# Patient Record
Sex: MALE | Race: WHITE | Employment: FULL TIME | URBAN - METROPOLITAN AREA
[De-identification: names, ages, dates, MRNs, and addresses within clinical notes are randomized per-mention and may not be internally consistent; named-entity substitution may affect disease eponyms.]

---

## 2019-05-13 ENCOUNTER — HOSPITAL ENCOUNTER (EMERGENCY)
Age: 34
Discharge: HOME | End: 2019-05-14
Attending: EMERGENCY MEDICINE
Payer: SELF-PAY

## 2019-05-13 VITALS
HEART RATE: 109 BPM | OXYGEN SATURATION: 98 % | RESPIRATION RATE: 20 BRPM | DIASTOLIC BLOOD PRESSURE: 67 MMHG | SYSTOLIC BLOOD PRESSURE: 147 MMHG | TEMPERATURE: 98.6 F

## 2019-05-13 DIAGNOSIS — T78.40XA ALLERGIC REACTION, INITIAL ENCOUNTER: Primary | ICD-10-CM

## 2019-05-13 PROCEDURE — 99283 EMERGENCY DEPT VISIT LOW MDM: CPT | Performed by: EMERGENCY MEDICINE

## 2019-05-13 PROCEDURE — 96374 THER/PROPH/DIAG INJ IV PUSH: CPT | Performed by: EMERGENCY MEDICINE

## 2019-05-13 PROCEDURE — 96375 TX/PRO/DX INJ NEW DRUG ADDON: CPT | Performed by: EMERGENCY MEDICINE

## 2019-05-13 PROCEDURE — 74011250636 HC RX REV CODE- 250/636: Performed by: EMERGENCY MEDICINE

## 2019-05-13 PROCEDURE — 96372 THER/PROPH/DIAG INJ SC/IM: CPT | Performed by: EMERGENCY MEDICINE

## 2019-05-13 RX ORDER — DIPHENHYDRAMINE HYDROCHLORIDE 50 MG/ML
25 INJECTION, SOLUTION INTRAMUSCULAR; INTRAVENOUS
Status: COMPLETED | OUTPATIENT
Start: 2019-05-13 | End: 2019-05-13

## 2019-05-13 RX ORDER — EPINEPHRINE 1 MG/ML
0.3 INJECTION, SOLUTION, CONCENTRATE INTRAVENOUS ONCE
Status: COMPLETED | OUTPATIENT
Start: 2019-05-13 | End: 2019-05-13

## 2019-05-13 RX ADMIN — EPINEPHRINE 0.3 MG: 1 INJECTION, SOLUTION, CONCENTRATE INTRAVENOUS at 23:23

## 2019-05-13 RX ADMIN — METHYLPREDNISOLONE SODIUM SUCCINATE 125 MG: 125 INJECTION, POWDER, FOR SOLUTION INTRAMUSCULAR; INTRAVENOUS at 23:25

## 2019-05-13 RX ADMIN — SODIUM CHLORIDE 1000 ML: 900 INJECTION, SOLUTION INTRAVENOUS at 23:27

## 2019-05-13 RX ADMIN — DIPHENHYDRAMINE HYDROCHLORIDE 25 MG: 50 INJECTION, SOLUTION INTRAMUSCULAR; INTRAVENOUS at 23:25

## 2019-05-14 RX ORDER — PERMETHRIN 50 MG/G
CREAM TOPICAL
Qty: 60 G | Refills: 0 | Status: SHIPPED | OUTPATIENT
Start: 2019-05-14 | End: 2019-06-13

## 2019-05-14 RX ORDER — NEBIVOLOL 10 MG/1
10 TABLET ORAL DAILY
Qty: 30 TAB | Refills: 0 | Status: SHIPPED | OUTPATIENT
Start: 2019-05-14 | End: 2019-06-13

## 2019-05-14 RX ORDER — EPINEPHRINE 0.3 MG/.3ML
0.3 INJECTION SUBCUTANEOUS
Qty: 2 SYRINGE | Refills: 0 | Status: SHIPPED | OUTPATIENT
Start: 2019-05-14 | End: 2019-05-14

## 2019-05-14 RX ORDER — PREDNISONE 20 MG/1
60 TABLET ORAL DAILY
Qty: 15 TAB | Refills: 0 | Status: SHIPPED | OUTPATIENT
Start: 2019-05-14 | End: 2019-05-19

## 2019-05-14 NOTE — ED TRIAGE NOTES
Pt to er c/o having swellling and sob since around noon today, sts he took 2 benadryl and swelling has continued and sts this sob has gotten worse.

## 2019-05-14 NOTE — DISCHARGE INSTRUCTIONS
Apply the cream as instructed. Take your blood pressure medication. Take steroids for the next 5 days. Use EpiPen as needed for anaphylactic. Follow-up with a local doctor. Return if worsening symptoms.

## 2019-05-14 NOTE — ED PROVIDER NOTES
HPI: 
29-year-old male is here with itchiness and rash to started tonight. Stated he recently moved to the area. Supposed to be on blood pressure medication by systolic tablet daily but has not taken it. He uses testosterone injection in his right buttocks for both low testosterone and to help build muscle. He is here because he started developing rash along the wrist, hand, feet with itchiness. He is currently staying at a hotel room. This started tonight. Denies any fever. No throat swelling nor had some brief episode of shortness of breath. Denies any chest pain. No tongue swelling. No tongue numbness. Denies any IV drug use. No new detergent, body wash, antibiotics, new medication. They'll think that's different is the testosterone that he is currently using he obtained from a friend instead of prescribed by a physician ROS Constitutional: No fever, no chills Skin: + rash Eye: No vision changes ENMT: No sore throat Respiratory: + shortness of breath, no cough Cardiovascular: No chest pain, no palpitations Gastrointestinal: No vomiting, no nausea, no diarrhea, no abdominal pain : No dysuria MSK: No back pain, no muscle pain, no joint pain Neuro: No headache, no change in mental status, no numbness, no tingling, no weakness Psych:  
Endocrine:  
All other review of systems positive per history of present illness and the above otherwise negative or noncontributory. Visit Vitals /67 Pulse (!) 109 Temp 98.6 °F (37 °C) Resp 20 SpO2 98% History reviewed. No pertinent past medical history. History reviewed. No pertinent surgical history. None Adult Exam  
General: alert, no acute distress Head: normocephalic, atraumatic ENT: moist mucous membranes No lingual, sublingual, posterior pharynx swelling or edema noted Neck: supple, non-tender; full range of motion Cardiovascular: regular rate and rhythm, normal peripheral perfusion, no edema Respiratory:  normal respirations; no wheezing, rales or rhonchi Gastrointestinal: soft, non-tender; no rebound or guarding, no peritoneal signs, no distension Back: non-tender, full range of motion Musculoskeletal: normal range of motion, normal strength, no gross deformities Nonspecific erythema without blistering, blanching noted on bilateral wrist, hand, base of the feet, weapon of the feet. Neurological: alert and oriented x 4, no gross focal deficits; normal speech Psychiatric: cooperative; appropriate mood and affect MDM: stated it is getting worse. IM epi given. No shortness of breath at this time. Solu-Medrol given. Benadryl given. IV fluid given. Symptom has not progressed. Concern of possible bug bite, bedbugs since he is currently living at a hotel room. He also has a rash on his right buttocks where he injects the testosterone. chronic, thickened skin, erythematous without purulent discharge, induration. We'll give him a refill for by systolic. We'll give prednisone for the next 5 days. We'll also give a prescription for an EpiPen. Recommend Benadryl 25-50 mg as needed at home. We'll also get permethrin cream over the area of the hand itchiness, wrists and feet itchiness. Dragon voice recognition software was used to create this note. Although the note has been reviewed and corrected where necessary, additional errors may have been overlooked and remain in the text.

## 2019-05-15 ENCOUNTER — HOSPITAL ENCOUNTER (EMERGENCY)
Age: 34
Discharge: HOME | End: 2019-05-15
Attending: EMERGENCY MEDICINE
Payer: SELF-PAY

## 2019-05-15 VITALS
TEMPERATURE: 98.3 F | DIASTOLIC BLOOD PRESSURE: 70 MMHG | OXYGEN SATURATION: 98 % | HEART RATE: 86 BPM | WEIGHT: 255 LBS | RESPIRATION RATE: 14 BRPM | SYSTOLIC BLOOD PRESSURE: 134 MMHG

## 2019-05-15 DIAGNOSIS — L50.9 URTICARIA: Primary | ICD-10-CM

## 2019-05-15 LAB — DEPRECATED S PYO AG THROAT QL EIA: NEGATIVE

## 2019-05-15 PROCEDURE — 87880 STREP A ASSAY W/OPTIC: CPT

## 2019-05-15 PROCEDURE — 74011250636 HC RX REV CODE- 250/636: Performed by: EMERGENCY MEDICINE

## 2019-05-15 PROCEDURE — 99283 EMERGENCY DEPT VISIT LOW MDM: CPT | Performed by: EMERGENCY MEDICINE

## 2019-05-15 PROCEDURE — 96374 THER/PROPH/DIAG INJ IV PUSH: CPT | Performed by: EMERGENCY MEDICINE

## 2019-05-15 PROCEDURE — 96375 TX/PRO/DX INJ NEW DRUG ADDON: CPT | Performed by: EMERGENCY MEDICINE

## 2019-05-15 PROCEDURE — 96361 HYDRATE IV INFUSION ADD-ON: CPT | Performed by: EMERGENCY MEDICINE

## 2019-05-15 PROCEDURE — 87081 CULTURE SCREEN ONLY: CPT

## 2019-05-15 RX ORDER — DIPHENHYDRAMINE HYDROCHLORIDE 50 MG/ML
50 INJECTION, SOLUTION INTRAMUSCULAR; INTRAVENOUS
Status: COMPLETED | OUTPATIENT
Start: 2019-05-15 | End: 2019-05-15

## 2019-05-15 RX ORDER — HYDROXYZINE 50 MG/1
50 TABLET, FILM COATED ORAL
Qty: 20 TAB | Refills: 0 | Status: SHIPPED | OUTPATIENT
Start: 2019-05-15 | End: 2019-05-25

## 2019-05-15 RX ORDER — FAMOTIDINE 10 MG/ML
20 INJECTION INTRAVENOUS
Status: COMPLETED | OUTPATIENT
Start: 2019-05-15 | End: 2019-05-15

## 2019-05-15 RX ADMIN — DIPHENHYDRAMINE HYDROCHLORIDE 50 MG: 50 INJECTION, SOLUTION INTRAMUSCULAR; INTRAVENOUS at 11:31

## 2019-05-15 RX ADMIN — SODIUM CHLORIDE 500 ML: 900 INJECTION, SOLUTION INTRAVENOUS at 11:31

## 2019-05-15 RX ADMIN — METHYLPREDNISOLONE SODIUM SUCCINATE 125 MG: 125 INJECTION, POWDER, FOR SOLUTION INTRAMUSCULAR; INTRAVENOUS at 11:31

## 2019-05-15 RX ADMIN — FAMOTIDINE 20 MG: 10 INJECTION, SOLUTION INTRAVENOUS at 11:31

## 2019-05-15 NOTE — ED PROVIDER NOTES
Olivia Ceron is a 29 y.o. male seen on 5/15/2019 at 5:20 AM in the [address] [place] [address] in room ER04/04. Chief Complaint Patient presents with  Allergic Reaction HPI:  71-year-old male presenting to the emergency department for an itching rash over his body. He states it started this morning getting worse. He reports that he was seen in the emergency department 2 days ago for an allergic reaction for which she received epinephrine in route by EMS. He then received Solu-Medrol and Benadryl here in the emergency department. He states that his symptoms improved. They were unable to identify a definite trigger but he was concerned that it was possibly related to bedbugs are staying in a new hotel that he does not think was very nice. He washed his clothes and then states when he put on the same clothes again he began to have the rash again. He is not having any difficulty breathing, no wheezing. He states his rash is worse around his waistline and also around his watch. He also notes that he's had a little bit of reflux recently. He also takes testosterone injections. He is from [place] and he has been here for 3 weeks and ran out of his medications and he's been buying testosterone off the streets. However he has used it 4-5 times over the last 2 weeks and has not previously had a reaction. He denies any history of tick bites, has not eaten red meat recently. Historian: patient REVIEW OF SYSTEMS Review of Systems Constitutional: Negative for fatigue and fever. HENT: Negative. Eyes: Negative. Respiratory: Negative for cough, chest tightness, shortness of breath and wheezing. Cardiovascular: Negative for chest pain. Gastrointestinal: Negative for abdominal distention, abdominal pain, diarrhea, nausea and vomiting. Genitourinary: Negative for dysuria, flank pain, frequency, genital sores and urgency. Musculoskeletal: Negative. Skin: Positive for rash. Neurological: Negative for dizziness, syncope and headaches. All other systems reviewed and are negative. PAST MEDICAL HISTORY History reviewed. No pertinent past medical history. History reviewed. No pertinent surgical history. Social History Socioeconomic History  Marital status:  Spouse name: Not on file  Number of children: Not on file  Years of education: Not on file  Highest education level: Not on file Tobacco Use  Smoking status: Current Every Day Smoker  Smokeless tobacco: Never Used Substance and Sexual Activity  Alcohol use: Yes  Drug use: Never Prior to Admission Medications Prescriptions Last Dose Informant Patient Reported? Taking? EPINEPHrine (EPIPEN) 0.3 mg/0.3 mL injection   No No  
Si.3 mL by IntraMUSCular route once as needed for Anaphylaxis for up to 1 dose. Indications: Life Threatening Allergic Reaction, Use if you feel throat swelling, tongue swelling with difficulty breathing. Return to the nearest Emergency Department for evaluation if you use this medication  
nebivolol (BYSTOLIC) 10 mg tablet   No No  
Sig: Take 1 Tab by mouth daily for 30 days. Indications: high blood pressure  
permethrin (ACTICIN) 5 % topical cream   No No  
Sig: Apply a thick layover affected area. Leave on for at least 12 hours. predniSONE (DELTASONE) 20 mg tablet   No No  
Sig: Take 60 mg by mouth daily for 5 days. With Breakfast  
  
Facility-Administered Medications: None Allergies Allergen Reactions  Mold Shortness of Breath PHYSICAL EXAM    
 
Vitals:  
 05/15/19 1108 BP: 149/84 Pulse: 98 Resp: 20 Temp: 98 °F (36.7 °C) SpO2: 98% Vital signs were reviewed. Physical Exam  
Constitutional: He is oriented to person, place, and time. He appears well-developed and well-nourished. No distress. HENT:  
Head: Normocephalic and atraumatic. Eyes: Pupils are equal, round, and reactive to light. EOM are normal.  
Neck: Normal range of motion. Neck supple. Cardiovascular: Normal rate, regular rhythm, normal heart sounds and intact distal pulses. Exam reveals no gallop and no friction rub. No murmur heard. Pulmonary/Chest: Effort normal and breath sounds normal. No stridor. No respiratory distress. He has no wheezes. Abdominal: Soft. Bowel sounds are normal. He exhibits no distension and no mass. There is no tenderness. There is no rebound and no guarding. Musculoskeletal: Normal range of motion. He exhibits no edema, tenderness or deformity. Neurological: He is alert and oriented to person, place, and time. No sensory deficit. Coordination normal.  
No focal deficits Skin: Skin is warm. No rash noted. He is not diaphoretic. No erythema. Diffuse urticarial rash over waist, hips, arms, diffuse raised erythema with central clearing Psychiatric: He has a normal mood and affect. His behavior is normal. Thought content normal.  
Vitals reviewed. MEDICAL DECISION MAKING Differential Diagnosis: allergic reaction, chronic urticaria, anaphylaxis, alpha gal syndrome, allergic reaction to testosterone MDM Procedures ED Course: The patient is not currently experiencing anaphylaxis. He has some mild to moderate urticarial reaction. He'll receive steroids, Benadryl, famotidine. Epinephrine not indicated at this time. Patient was observed for 2 hours with no progression of symptoms. I think this most likely represents urticaria. I think it is unlikely to be a severe allergic reaction. Disposition:  Discharge home Diagnosis:  urticaria 
____________________________________________________________________ A portion of this note was generated using voice recognition dictation software.  While the note has been reviewed for accuracy, please note certain words and phrases may not be transcribed as intended that some grammatical and/or typographical errors may be present.

## 2019-05-15 NOTE — ED NOTES
I have reviewed discharge instructions with the patient. The patient verbalized understanding. Patient left ED via Discharge Method: ambulatory to Home with family. Opportunity for questions and clarification provided. Patient given 1 scripts. To continue your aftercare when you leave the hospital, you may receive an automated call from our care team to check in on how you are doing. This is a free service and part of our promise to provide the best care and service to meet your aftercare needs.  If you have questions, or wish to unsubscribe from this service please call [phone]. Thank you for Choosing our [facility] Emergency Department.

## 2019-05-15 NOTE — PROGRESS NOTES
NAYE met with the patient who states that he's new to the area and moved here for work. Patient states that he has insurance, but it's a new policy and so he's not sure of which company it's through. Patient expressed a desire to become established with primary care. SW confirmed the patient's phone number and referred him to [facility]. Patient is currently living in a hotel. ALEX Bello  [email or web address] [email or web address] Hospital

## 2019-05-15 NOTE — ED TRIAGE NOTES
Pt here for allergic reaction. Pt states similar reaction occurred on 5/13/2019 requiring epi. Pt states he is having shortness of breath and hives.  
 
Sivakumar Hernández RN

## 2019-05-17 LAB
BACTERIA SPEC CULT: ABNORMAL
SERVICE CMNT-IMP: ABNORMAL

## 2019-05-18 NOTE — PROGRESS NOTES
Maurilio Umana through [facility] tried to schedule the patient for an appointment with a new PCP. However, his phone number is incorrect despite SW confirming demographics with the patient at bedside. Patient's only other contact is his ex-wife. SW will confirm demographics again if the patient returns to the ED or calls for follow up. ALEX Hernandez  [facility] [email or web address] [facility]

## 2019-05-21 ENCOUNTER — HOSPITAL ENCOUNTER (EMERGENCY)
Age: 34
Discharge: HOME | End: 2019-05-21
Attending: EMERGENCY MEDICINE
Payer: SELF-PAY

## 2019-05-21 VITALS
HEIGHT: 74 IN | BODY MASS INDEX: 32.73 KG/M2 | OXYGEN SATURATION: 98 % | SYSTOLIC BLOOD PRESSURE: 150 MMHG | RESPIRATION RATE: 16 BRPM | WEIGHT: 255 LBS | TEMPERATURE: 98.7 F | DIASTOLIC BLOOD PRESSURE: 92 MMHG | HEART RATE: 78 BPM

## 2019-05-21 DIAGNOSIS — L50.9 URTICARIA: Primary | ICD-10-CM

## 2019-05-21 PROCEDURE — 96372 THER/PROPH/DIAG INJ SC/IM: CPT | Performed by: PHYSICIAN ASSISTANT

## 2019-05-21 PROCEDURE — 99283 EMERGENCY DEPT VISIT LOW MDM: CPT | Performed by: PHYSICIAN ASSISTANT

## 2019-05-21 PROCEDURE — 74011250636 HC RX REV CODE- 250/636: Performed by: EMERGENCY MEDICINE

## 2019-05-21 PROCEDURE — 74011250637 HC RX REV CODE- 250/637: Performed by: EMERGENCY MEDICINE

## 2019-05-21 RX ORDER — HYDROXYZINE 50 MG/1
50 TABLET, FILM COATED ORAL
Qty: 20 TAB | Refills: 0 | Status: SHIPPED | OUTPATIENT
Start: 2019-05-21 | End: 2019-05-31

## 2019-05-21 RX ORDER — DEXAMETHASONE SODIUM PHOSPHATE 100 MG/10ML
20 INJECTION INTRAMUSCULAR; INTRAVENOUS
Status: COMPLETED | OUTPATIENT
Start: 2019-05-21 | End: 2019-05-21

## 2019-05-21 RX ORDER — FAMOTIDINE 20 MG/1
20 TABLET, FILM COATED ORAL
Status: COMPLETED | OUTPATIENT
Start: 2019-05-21 | End: 2019-05-21

## 2019-05-21 RX ORDER — PREDNISONE 10 MG/1
TABLET ORAL
Qty: 21 TAB | Refills: 0 | Status: SHIPPED | OUTPATIENT
Start: 2019-05-21

## 2019-05-21 RX ADMIN — FAMOTIDINE 20 MG: 20 TABLET ORAL at 11:47

## 2019-05-21 RX ADMIN — DEXAMETHASONE SODIUM PHOSPHATE 20 MG: 10 INJECTION INTRAMUSCULAR; INTRAVENOUS at 11:47

## 2019-05-21 NOTE — ED NOTES
I have reviewed discharge instructions with the patient. The patient verbalized understanding. Patient left ED via Discharge Method: ambulatory to Home with self. Opportunity for questions and clarification provided. Patient given 3 scripts. To continue your aftercare when you leave the hospital, you may receive an automated call from our care team to check in on how you are doing. This is a free service and part of our promise to provide the best care and service to meet your aftercare needs.  If you have questions, or wish to unsubscribe from this service please call [phone]. Thank you for Choosing our [facility] Emergency Department.

## 2019-05-21 NOTE — PROGRESS NOTES
Visited with patient. States he just relocated here from [place] and is staying in a hotel waiting to purchase a home in the next month. States this is his third time recently with an allergic reaction and he \"can't live like this\". States he tried to get into [facility] however they did not have an appointment until the third week in June. Call to [facility] and spoke with [facility] who consulted with their physician and states they cannot get him in until Inez 3. Call to [facility] and spoke with [facility]. Appointment made for this Friday, May 24 at 2 PM with Dr. Gwendolyn Cali. 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 At Anna's request patient informed that there is a $200 deposit with a 20% office visit charge after the visit with a 40% discount if paid in full at that visit. Patient notified, per [facility], that environmental testing is $1000 and patient states \"that's exactly what I need\". Confirmed patient's phone number and it is incorrect on the face sheet. I have corrected it in the system and sent a new referral to Juliane Ponce with physician finder to get patient establish with a PCP.

## 2019-05-21 NOTE — DISCHARGE INSTRUCTIONS
Patient Education   Hydroxyzine   Pepcid or Zantac twice daily  Hold unessential present medications( refill Linzess after sees allergist)  Begin prednisone Thursday, if worsening  Hope things are better soon     Hives: Care Instructions  Your Care Instructions  Hives are raised, red, itchy patches of skin. They are also called wheals or welts. They usually have red borders and pale centers. Hives range in size from ¼ inch to 3 inches or more across. They may seem to move from place to place on the skin. Several hives may form a large area of raised, red skin. You can get hives after an insect sting, after taking medicine or eating certain foods, or because of infection or stress. Other causes include plants, things you breathe in, makeup, heat, cold, sunlight, and latex. You cannot spread hives to other people. Hives may last a few minutes or a few days, but a single spot may last less than 36 hours. Follow-up care is a key part of your treatment and safety. Be sure to make and go to all appointments, and call your doctor if you are having problems. It's also a good idea to know your test results and keep a list of the medicines you take. How can you care for yourself at home? Avoid whatever you think may have caused your hives, such as a certain food or medicine. However, you may not know the cause. Put a cool, wet towel on the area to relieve itching. Take an over-the-counter antihistamine, such as diphenhydramine (Benadryl), cetirizine (Zyrtec), or loratadine (Claritin), to help stop the hives and calm the itching. Read and follow directions on the label. These medicines can make you feel sleepy. Do not drive while using them. Stay away from strong soaps, detergents, and chemicals. These can make itching worse. When should you call for help? Call 911 anytime you think you may need emergency care. For example, call if:    You have symptoms of a severe allergic reaction.  These may include:  Sudden raised, red areas (hives) all over your body. Swelling of the throat, mouth, lips, or tongue. Trouble breathing. Passing out (losing consciousness). Or you may feel very lightheaded or suddenly feel weak, confused, or restless. Call your doctor now or seek immediate medical care if:    You have symptoms of an allergic reaction, such as:  A rash or hives (raised, red areas on the skin). Itching. Swelling. Belly pain, nausea, or vomiting. You get hives after you start a new medicine. Hives have not gone away after 24 hours. Watch closely for changes in your health, and be sure to contact your doctor if:    You do not get better as expected. Where can you learn more? Go to http://ann-garry.info/. Enter L873 in the search box to learn more about \"Hives: Care Instructions. \"  Current as of: September 23, 2018  Content Version: 11.9  © 1187-8567 [employer]. Care instructions adapted under license by [employer] (which disclaims liability or warranty for this information). If you have questions about a medical condition or this instruction, always ask your healthcare professional. [address] any warranty or liability for your use of this information.

## 2019-05-21 NOTE — ED TRIAGE NOTES
Pt in states allergic reaction to unknown substance. States he has swelling to face, rash to arms legs groin and abdomen. Has been seen in past for same. Took benadryl without relief.

## 2019-05-21 NOTE — ED PROVIDER NOTES
he has recently relocated from [place]. he has recurring urticarial changes of an ongoing now for over 1 week. He has been seen twice prior in our department for the same. No history of urticaria or familial history of this. No new medications. No wheezing, though at times he feels fullness in his neck. Attempted to get into an allergist and was given that there was no availability for over one month. Improved after a course of steroids, only to have returned. Addition to the listed medications. Patient is additionally chronically on testosterone IM and linzess. The history is provided by the patient. Allergic Reaction    This is a recurrent problem. History reviewed. No pertinent past medical history. History reviewed. No pertinent surgical history. History reviewed. No pertinent family history. Social History     Socioeconomic History    Marital status:      Spouse name: Not on file    Number of children: Not on file    Years of education: Not on file    Highest education level: Not on file   Occupational History    Not on file   Social Needs    Financial resource strain: Not on file    Food insecurity:     Worry: Not on file     Inability: Not on file    Transportation needs:     Medical: Not on file     Non-medical: Not on file   Tobacco Use    Smoking status: Current Every Day Smoker    Smokeless tobacco: Never Used   Substance and Sexual Activity    Alcohol use:  Yes    Drug use: Never    Sexual activity: Not on file   Lifestyle    Physical activity:     Days per week: Not on file     Minutes per session: Not on file    Stress: Not on file   Relationships    Social connections:     Talks on phone: Not on file     Gets together: Not on file     Attends Anabaptism service: Not on file     Active member of club or organization: Not on file     Attends meetings of clubs or organizations: Not on file     Relationship status: Not on file    Intimate partner violence:     Fear of current or ex partner: Not on file     Emotionally abused: Not on file     Physically abused: Not on file     Forced sexual activity: Not on file   Other Topics Concern    Not on file   Social History Narrative    Not on file         ALLERGIES: Mold    Review of Systems   Constitutional: Negative for chills and fever. HENT: Negative for sneezing, sore throat and voice change. Eyes: Negative. Respiratory: Negative for wheezing. Cardiovascular: Negative for chest pain, palpitations and leg swelling. Genitourinary: Negative. Skin:        Scattered urticaria. Slight right upper lip swelling   Psychiatric/Behavioral: Negative. All other systems reviewed and are negative. Vitals:    05/21/19 1041 05/21/19 1223   BP: 145/79 (!) 150/92   Pulse: 98 78   Resp: 20 16   Temp: 97.7 °F (36.5 °C) 98.7 °F (37.1 °C)   SpO2: 98% 98%   Weight: 115.7 kg (255 lb)    Height: 6' 2\" (1.88 m)             Physical Exam   Constitutional: He appears well-developed and well-nourished. No distress. HENT:   Head: Atraumatic. Left Ear: External ear normal.   Mouth/Throat: Oropharynx is clear and moist.   Eyes: No scleral icterus. Neck: Neck supple. No stridor   Cardiovascular: Normal rate and normal heart sounds. Pulmonary/Chest: Effort normal. No respiratory distress. He has no wheezes. Abdominal: Soft. Musculoskeletal: He exhibits no tenderness. Lymphadenopathy:     He has no cervical adenopathy. Neurological: No sensory deficit. He exhibits normal muscle tone. Skin: Skin is warm and dry. He is not diaphoretic. Mild angioedema2. Right upper lip. Also scattered small areas of urticarial lesions. He has somewhat reactive skin in general   Psychiatric: Thought content normal.   Nursing note and vitals reviewed. MDM  Number of Diagnoses or Management Options  Urticaria:   Diagnosis management comments: Recurring urticaria with mild angioedema to the right upper lip. Airways are totally clear. No wheezing or other more significant present findings.  Staff has talked to allergist and they will see him on Friday       Amount and/or Complexity of Data Reviewed  Decide to obtain previous medical records or to obtain history from someone other than the patient: yes    Risk of Complications, Morbidity, and/or Mortality  Presenting problems: moderate  Diagnostic procedures: low  Management options: moderate    Patient Progress  Patient progress: stable         Procedures

## 2019-05-22 NOTE — PROGRESS NOTES
Email received from Anatoliy Humphries with [facility] physician finder that she tried new number had no one answered with no voicemail set up.

## 2019-05-22 NOTE — PROGRESS NOTES
ER clinical faxed to [facility] for appointment on Friday/continuity of care.

## 2020-02-24 NOTE — ED NOTES
I have reviewed discharge instructions with the patient. The patient verbalized understanding. Patient left ED via Discharge Method: ambulatory to Home with  friend). Opportunity for questions and clarification provided. Patient given 4 scripts. To continue your aftercare when you leave the hospital, you may receive an automated call from our care team to check in on how you are doing. This is a free service and part of our promise to provide the best care and service to meet your aftercare needs.  If you have questions, or wish to unsubscribe from this service please call [phone]. Thank you for Choosing our [facility] Emergency Department. 8